# Patient Record
Sex: FEMALE | Race: WHITE | NOT HISPANIC OR LATINO | Employment: OTHER | URBAN - METROPOLITAN AREA
[De-identification: names, ages, dates, MRNs, and addresses within clinical notes are randomized per-mention and may not be internally consistent; named-entity substitution may affect disease eponyms.]

---

## 2019-08-17 ENCOUNTER — HOSPITAL ENCOUNTER (EMERGENCY)
Facility: HOSPITAL | Age: 74
Discharge: HOME | End: 2019-08-17
Attending: EMERGENCY MEDICINE | Admitting: EMERGENCY MEDICINE

## 2019-08-17 ENCOUNTER — APPOINTMENT (OUTPATIENT)
Dept: GENERAL RADIOLOGY | Facility: HOSPITAL | Age: 74
End: 2019-08-17

## 2019-08-17 VITALS
TEMPERATURE: 97.6 F | SYSTOLIC BLOOD PRESSURE: 112 MMHG | BODY MASS INDEX: 21.83 KG/M2 | HEIGHT: 65 IN | WEIGHT: 131 LBS | DIASTOLIC BLOOD PRESSURE: 67 MMHG | OXYGEN SATURATION: 97 % | RESPIRATION RATE: 18 BRPM | HEART RATE: 72 BPM

## 2019-08-17 DIAGNOSIS — I25.118 CORONARY ARTERY DISEASE INVOLVING NATIVE CORONARY ARTERY OF NATIVE HEART WITH OTHER FORM OF ANGINA PECTORIS (HCC): ICD-10-CM

## 2019-08-17 DIAGNOSIS — I20.9 ANGINA PECTORIS (HCC): Primary | ICD-10-CM

## 2019-08-17 LAB
ALBUMIN SERPL-MCNC: 4.2 G/DL (ref 3.5–5.2)
ALBUMIN/GLOB SERPL: 1.6 G/DL
ALP SERPL-CCNC: 77 U/L (ref 39–117)
ALT SERPL W P-5'-P-CCNC: 12 U/L (ref 1–33)
ANION GAP SERPL CALCULATED.3IONS-SCNC: 10 MMOL/L (ref 5–15)
AST SERPL-CCNC: 17 U/L (ref 1–32)
BASOPHILS # BLD AUTO: 0.03 10*3/MM3 (ref 0–0.2)
BASOPHILS NFR BLD AUTO: 0.6 % (ref 0–1.5)
BILIRUB SERPL-MCNC: 0.3 MG/DL (ref 0.2–1.2)
BUN BLD-MCNC: 13 MG/DL (ref 8–23)
BUN/CREAT SERPL: 14.1 (ref 7–25)
CALCIUM SPEC-SCNC: 9 MG/DL (ref 8.6–10.5)
CHLORIDE SERPL-SCNC: 106 MMOL/L (ref 98–107)
CO2 SERPL-SCNC: 27 MMOL/L (ref 22–29)
CREAT BLD-MCNC: 0.92 MG/DL (ref 0.57–1)
DEPRECATED RDW RBC AUTO: 45.5 FL (ref 37–54)
EOSINOPHIL # BLD AUTO: 0.06 10*3/MM3 (ref 0–0.4)
EOSINOPHIL NFR BLD AUTO: 1.2 % (ref 0.3–6.2)
ERYTHROCYTE [DISTWIDTH] IN BLOOD BY AUTOMATED COUNT: 14.1 % (ref 12.3–15.4)
GFR SERPL CREATININE-BSD FRML MDRD: 60 ML/MIN/1.73
GLOBULIN UR ELPH-MCNC: 2.6 GM/DL
GLUCOSE BLD-MCNC: 100 MG/DL (ref 65–99)
HCT VFR BLD AUTO: 38.3 % (ref 34–46.6)
HGB BLD-MCNC: 12 G/DL (ref 12–15.9)
HOLD SPECIMEN: NORMAL
HOLD SPECIMEN: NORMAL
IMM GRANULOCYTES # BLD AUTO: 0.02 10*3/MM3 (ref 0–0.05)
IMM GRANULOCYTES NFR BLD AUTO: 0.4 % (ref 0–0.5)
LIPASE SERPL-CCNC: 34 U/L (ref 13–60)
LYMPHOCYTES # BLD AUTO: 1.29 10*3/MM3 (ref 0.7–3.1)
LYMPHOCYTES NFR BLD AUTO: 25.5 % (ref 19.6–45.3)
MCH RBC QN AUTO: 27.6 PG (ref 26.6–33)
MCHC RBC AUTO-ENTMCNC: 31.3 G/DL (ref 31.5–35.7)
MCV RBC AUTO: 88 FL (ref 79–97)
MONOCYTES # BLD AUTO: 0.46 10*3/MM3 (ref 0.1–0.9)
MONOCYTES NFR BLD AUTO: 9.1 % (ref 5–12)
NEUTROPHILS # BLD AUTO: 3.19 10*3/MM3 (ref 1.7–7)
NEUTROPHILS NFR BLD AUTO: 63.2 % (ref 42.7–76)
NRBC BLD AUTO-RTO: 0 /100 WBC (ref 0–0.2)
NT-PROBNP SERPL-MCNC: 173.8 PG/ML (ref 5–900)
PLATELET # BLD AUTO: 187 10*3/MM3 (ref 140–450)
PMV BLD AUTO: 11.5 FL (ref 6–12)
POTASSIUM BLD-SCNC: 4.1 MMOL/L (ref 3.5–5.2)
PROT SERPL-MCNC: 6.8 G/DL (ref 6–8.5)
RBC # BLD AUTO: 4.35 10*6/MM3 (ref 3.77–5.28)
SODIUM BLD-SCNC: 143 MMOL/L (ref 136–145)
TROPONIN T SERPL-MCNC: <0.01 NG/ML (ref 0–0.03)
TROPONIN T SERPL-MCNC: <0.01 NG/ML (ref 0–0.03)
WBC NRBC COR # BLD: 5.05 10*3/MM3 (ref 3.4–10.8)
WHOLE BLOOD HOLD SPECIMEN: NORMAL
WHOLE BLOOD HOLD SPECIMEN: NORMAL

## 2019-08-17 PROCEDURE — 99284 EMERGENCY DEPT VISIT MOD MDM: CPT

## 2019-08-17 PROCEDURE — 84484 ASSAY OF TROPONIN QUANT: CPT | Performed by: EMERGENCY MEDICINE

## 2019-08-17 PROCEDURE — 85025 COMPLETE CBC W/AUTO DIFF WBC: CPT | Performed by: EMERGENCY MEDICINE

## 2019-08-17 PROCEDURE — 83690 ASSAY OF LIPASE: CPT | Performed by: EMERGENCY MEDICINE

## 2019-08-17 PROCEDURE — 93005 ELECTROCARDIOGRAM TRACING: CPT | Performed by: EMERGENCY MEDICINE

## 2019-08-17 PROCEDURE — 80053 COMPREHEN METABOLIC PANEL: CPT | Performed by: EMERGENCY MEDICINE

## 2019-08-17 PROCEDURE — 83880 ASSAY OF NATRIURETIC PEPTIDE: CPT | Performed by: EMERGENCY MEDICINE

## 2019-08-17 PROCEDURE — 71045 X-RAY EXAM CHEST 1 VIEW: CPT

## 2019-08-17 RX ORDER — OMEPRAZOLE 40 MG/1
40 CAPSULE, DELAYED RELEASE ORAL DAILY
COMMUNITY

## 2019-08-17 RX ORDER — ASPIRIN 81 MG/1
324 TABLET, CHEWABLE ORAL ONCE
Status: COMPLETED | OUTPATIENT
Start: 2019-08-17 | End: 2019-08-17

## 2019-08-17 RX ORDER — ATORVASTATIN CALCIUM 20 MG/1
20 TABLET, FILM COATED ORAL DAILY
COMMUNITY

## 2019-08-17 RX ORDER — ASPIRIN 81 MG/1
81 TABLET ORAL DAILY
COMMUNITY

## 2019-08-17 RX ORDER — SERTRALINE HYDROCHLORIDE 100 MG/1
100 TABLET, FILM COATED ORAL DAILY
COMMUNITY

## 2019-08-17 RX ORDER — LEVOTHYROXINE SODIUM 0.05 MG/1
50 TABLET ORAL DAILY
COMMUNITY

## 2019-08-17 RX ORDER — SODIUM CHLORIDE 0.9 % (FLUSH) 0.9 %
10 SYRINGE (ML) INJECTION AS NEEDED
Status: DISCONTINUED | OUTPATIENT
Start: 2019-08-17 | End: 2019-08-17 | Stop reason: HOSPADM

## 2019-08-17 RX ORDER — MELOXICAM 7.5 MG/1
7.5 TABLET ORAL DAILY
COMMUNITY

## 2019-08-17 RX ORDER — NITROGLYCERIN 0.4 MG/1
0.4 TABLET SUBLINGUAL
COMMUNITY

## 2019-08-17 RX ORDER — FLUTICASONE PROPIONATE 50 MCG
2 SPRAY, SUSPENSION (ML) NASAL DAILY
COMMUNITY

## 2019-08-17 RX ADMIN — ASPIRIN 81 MG 324 MG: 81 TABLET ORAL at 10:13

## 2019-08-17 NOTE — ED PROVIDER NOTES
Subjective   Ms. Vincent Barragan is a 74 year old female former smoker who presents to the ED with c/o chest pain.  Patient is currently visiting [place] from [place].  She and her  were touring [employer] [place] [employer] this morning when she was suddenly struck with considerable mid sternal chest pain, nausea, and profuse diaphoresis.  Patient reports these symptoms were reminiscent of past anginal pains, prompting her to take two nitroglycerin.  The nitroglycerins did help reduce her pain.  She is brought to the ED via EMS for evaluation of this.  In the emergency room, patient states her chest pain has completely resolved but has been replaced by a throbbing headache (which patient admits is probably a side effect of the nitro).  She is still mildly nauseous but denies any cough, congestion, fevers or chills.  Cardiac history includes coronary artery disease and myocardial infarction status post stenting.  Last stress test was a few years ago.  Other past medical history includes breast cancer, thyroid disease, and gastroesophageal reflux disease.        History provided by:  Patient  Chest Pain   Pain location:  Substernal area  Pain radiates to:  Does not radiate  Pain severity:  Severe  Onset quality:  Sudden  Duration:  15 minutes  Timing:  Constant  Progression:  Resolved  Chronicity:  New  Relieved by:  Nitroglycerin  Worsened by:  Nothing  Associated symptoms: diaphoresis, headache and nausea    Associated symptoms: no abdominal pain, no back pain, no cough, no fever, no shortness of breath and no vomiting    Risk factors: coronary artery disease    Risk factors: not male and not obese  Smoker: former.        Review of Systems   Constitutional: Positive for diaphoresis. Negative for chills and fever.   HENT: Negative for congestion, rhinorrhea and sore throat.    Respiratory: Negative for cough and shortness of breath.    Cardiovascular: Positive for chest pain.   Gastrointestinal: Positive for  nausea. Negative for abdominal pain and vomiting.   Musculoskeletal: Negative for back pain.   Neurological: Positive for headaches.   All other systems reviewed and are negative.      Past Medical History:   Diagnosis Date   • Anxiety    • Cancer (CMS/HCC)     BREAST   • Disease of thyroid gland    • Myocardial infarction (CMS/HCC)        Allergies   Allergen Reactions   • Penicillins Swelling   • Sulfa Antibiotics Nausea Only       Past Surgical History:   Procedure Laterality Date   • CORONARY ANGIOPLASTY WITH STENT PLACEMENT     • JOINT REPLACEMENT      HIP   • MASTECTOMY      RIGHT       History reviewed. No pertinent family history.    Social History     Socioeconomic History   • Marital status:      Spouse name: Not on file   • Number of children: Not on file   • Years of education: Not on file   • Highest education level: Not on file   Tobacco Use   • Smoking status: Former Smoker   Substance and Sexual Activity   • Alcohol use: No     Frequency: Never   • Drug use: No         Objective   Physical Exam   Constitutional: She is oriented to person, place, and time. She appears well-developed and well-nourished. No distress.   HENT:   Head: Normocephalic and atraumatic.   Nose: Nose normal.   Mouth/Throat: Oropharynx is clear and moist.   Airway patent.  Oropharynx benign.   Eyes: Conjunctivae are normal. No scleral icterus.   Normal eyes.   Neck: Normal range of motion and phonation normal. Neck supple.   Cardiovascular: Normal rate, regular rhythm and normal heart sounds. Exam reveals no gallop and no friction rub.   No murmur heard.  Regular rate and rhythm.   Pulmonary/Chest: Effort normal and breath sounds normal. No respiratory distress. She has no wheezes. She has no rales.   Lungs clear.   Abdominal: Soft. Bowel sounds are normal. There is no tenderness. There is no guarding.   Abdomen benign.   Musculoskeletal:        Right lower leg: She exhibits no edema.        Left lower leg: She exhibits no  edema.   No lower extremity edema.   Neurological: She is alert and oriented to person, place, and time.   Skin: Skin is warm and dry.   Psychiatric: She has a normal mood and affect. Her behavior is normal.   Nursing note and vitals reviewed.      Procedures         ED Course  ED Course as of Aug 17 1959   Sat Aug 17, 2019   1144 On serial recheck, patient is doing fine. 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  She has no choice in the matter and is trying to avoid getting stranded here in [place]. -LJI  [MU]      ED Course User Index  [MU] Dileep Ortega       Recent Results (from the past 24 hour(s))   Troponin    Collection Time: 08/17/19 10:11 AM   Result Value Ref Range    Troponin T <0.010 0.000 - 0.030 ng/mL   Comprehensive Metabolic Panel    Collection Time: 08/17/19 10:11 AM   Result Value Ref Range    Glucose 100 (H) 65 - 99 mg/dL    BUN 13 8 - 23 mg/dL    Creatinine 0.92 0.57 - 1.00 mg/dL    Sodium 143 136 - 145 mmol/L    Potassium 4.1 3.5 - 5.2 mmol/L    Chloride 106 98 - 107 mmol/L    CO2 27.0 22.0 - 29.0 mmol/L    Calcium 9.0 8.6 - 10.5 mg/dL    Total Protein 6.8 6.0 - 8.5 g/dL    Albumin 4.20 3.50 - 5.20 g/dL    ALT (SGPT) 12 1 - 33 U/L    AST (SGOT) 17 1 - 32 U/L    Alkaline Phosphatase 77 39 - 117 U/L    Total Bilirubin 0.3 0.2 - 1.2 mg/dL    eGFR Non African Amer 60 (L) >60 mL/min/1.73    Globulin 2.6 gm/dL    A/G Ratio 1.6 g/dL    BUN/Creatinine Ratio 14.1 7.0 - 25.0    Anion Gap 10.0 5.0 - 15.0 mmol/L   Lipase    Collection Time: 08/17/19 10:11 AM   Result Value Ref Range    Lipase 34 13 - 60 U/L   BNP    Collection Time: 08/17/19 10:11 AM   Result Value Ref Range    proBNP 173.8 5.0 - 900.0 pg/mL   Light Blue Top    Collection Time: 08/17/19 10:11 AM   Result Value Ref Range    Extra Tube hold for add-on    Green Top (Gel)    Collection Time: 08/17/19 10:11 AM   Result Value Ref Range    Extra Tube Hold for add-ons.    Lavender Top    Collection Time: 08/17/19  10:11 AM   Result Value Ref Range    Extra Tube hold for add-on    Gold Top - SST    Collection Time: 08/17/19 10:11 AM   Result Value Ref Range    Extra Tube Hold for add-ons.    CBC Auto Differential    Collection Time: 08/17/19 10:11 AM   Result Value Ref Range    WBC 5.05 3.40 - 10.80 10*3/mm3    RBC 4.35 3.77 - 5.28 10*6/mm3    Hemoglobin 12.0 12.0 - 15.9 g/dL    Hematocrit 38.3 34.0 - 46.6 %    MCV 88.0 79.0 - 97.0 fL    MCH 27.6 26.6 - 33.0 pg    MCHC 31.3 (L) 31.5 - 35.7 g/dL    RDW 14.1 12.3 - 15.4 %    RDW-SD 45.5 37.0 - 54.0 fl    MPV 11.5 6.0 - 12.0 fL    Platelets 187 140 - 450 10*3/mm3    Neutrophil % 63.2 42.7 - 76.0 %    Lymphocyte % 25.5 19.6 - 45.3 %    Monocyte % 9.1 5.0 - 12.0 %    Eosinophil % 1.2 0.3 - 6.2 %    Basophil % 0.6 0.0 - 1.5 %    Immature Grans % 0.4 0.0 - 0.5 %    Neutrophils, Absolute 3.19 1.70 - 7.00 10*3/mm3    Lymphocytes, Absolute 1.29 0.70 - 3.10 10*3/mm3    Monocytes, Absolute 0.46 0.10 - 0.90 10*3/mm3    Eosinophils, Absolute 0.06 0.00 - 0.40 10*3/mm3    Basophils, Absolute 0.03 0.00 - 0.20 10*3/mm3    Immature Grans, Absolute 0.02 0.00 - 0.05 10*3/mm3    nRBC 0.0 0.0 - 0.2 /100 WBC   Troponin    Collection Time: 08/17/19 12:18 PM   Result Value Ref Range    Troponin T <0.010 0.000 - 0.030 ng/mL     Note: In addition to lab results from this visit, the labs listed above may include labs taken at another facility or during a different encounter within the last 24 hours. Please correlate lab times with ED admission and discharge times for further clarification of the services performed during this visit.    XR Chest 1 View   Preliminary Result   Chronic changes in the lung fields with no evidence of acute   parenchymal disease.       DICTATED:   08/17/2019   EDITED/ls :   08/17/2019                 Vitals:    08/17/19 1006 08/17/19 1008 08/17/19 1100 08/17/19 1144   BP:  112/67     Pulse:   66 72   Resp:       Temp:  97.6 °F (36.4 °C)     TempSrc:  Oral     SpO2:   96% 97%  "  Weight: 59.4 kg (131 lb)      Height: 165.1 cm (65\")        Medications   aspirin chewable tablet 324 mg (324 mg Oral Given 8/17/19 1013)     ECG/EMG Results (last 24 hours)     Procedure Component Value Units Date/Time    ECG 12 Lead [[id number]] Collected:  08/17/19 1009     Updated:  08/17/19 1009    Narrative:       Test Reason : CP  Blood Pressure : **/** mmHG  Vent. Rate : 060 BPM     Atrial Rate : 060 BPM     P-R Int : 210 ms          QRS Dur : 086 ms      QT Int : 422 ms       P-R-T Axes : 067 014 061 degrees     QTc Int : 422 ms    Sinus rhythm with 1st degree AV block  Otherwise normal ECG  No previous ECGs available  Confirmed by ALEJANDRA PIMENTEL MD ([id number]) on 8/17/2019 10:09:08 AM    Referred By:  MARGARITO           Confirmed By:ALEJANDRA PIMENTEL MD        ECG 12 Lead   Final Result   Test Reason : 2nd set   Blood Pressure : **/** mmHG   Vent. Rate : 071 BPM     Atrial Rate : 071 BPM      P-R Int : 214 ms          QRS Dur : 088 ms       QT Int : 414 ms       P-R-T Axes : 069 008 055 degrees      QTc Int : 449 ms      Sinus rhythm with 1st degree AV block   Otherwise normal ECG   When compared with ECG of 17-AUG-2019 10:09,   No significant change was found   Confirmed by ALEJANDRA PIMENTEL MD ([id number]) on 8/17/2019 12:23:18 PM      Referred By:  [email or web address]           Confirmed By:ALEJANDRA PIMENTEL MD      ECG 12 Lead   Final Result   Test Reason : CP   Blood Pressure : **/** mmHG   Vent. Rate : 060 BPM     Atrial Rate : 060 BPM      P-R Int : 210 ms          QRS Dur : 086 ms       QT Int : 422 ms       P-R-T Axes : 067 014 061 degrees      QTc Int : 422 ms      Sinus rhythm with 1st degree AV block   Otherwise normal ECG   No previous ECGs available   Confirmed by ALEJANDRA PIMENTEL MD ([id number]) on 8/17/2019 10:09:08 AM      Referred By:  MARGARITO           Confirmed By:ALEJANDRA PIMENTEL MD                        [facility]    Final diagnoses:   Angina pectoris (CMS/HCC)   Coronary artery disease involving native coronary artery of native heart with " other form of angina pectoris (CMS/[facility])       Documentation assistance provided by alicia Ortega.  Information recorded by the scribe was done at my direction and has been verified and validated by me.     Dileep Ortega  08/17/19 1028       Kumar Santiago MD  08/17/19 2000

## 2019-08-17 NOTE — DISCHARGE INSTRUCTIONS
Take all your usual medications.  Use your nitroglycerin as needed for any further episodes of angina.  You will need to go to an ER if you have angina not relieved after three nitroglycerin.  Contact your cardiologist after you get back home to [place] to inform her or him of this angina episode.